# Patient Record
Sex: FEMALE | Race: WHITE | NOT HISPANIC OR LATINO | Employment: OTHER | URBAN - METROPOLITAN AREA
[De-identification: names, ages, dates, MRNs, and addresses within clinical notes are randomized per-mention and may not be internally consistent; named-entity substitution may affect disease eponyms.]

---

## 2019-08-16 ENCOUNTER — OFFICE VISIT (OUTPATIENT)
Dept: OPHTHALMOLOGY | Facility: CLINIC | Age: 69
End: 2019-08-16
Payer: MEDICARE

## 2019-08-16 DIAGNOSIS — H40.043 STEROID RESPONDERS, BILATERAL: ICD-10-CM

## 2019-08-16 DIAGNOSIS — H18.519 FUCHS' CORNEAL DYSTROPHY: Primary | ICD-10-CM

## 2019-08-16 PROCEDURE — 92004 COMPRE OPH EXAM NEW PT 1/>: CPT | Mod: S$PBB,,, | Performed by: OPHTHALMOLOGY

## 2019-08-16 PROCEDURE — 99202 OFFICE O/P NEW SF 15 MIN: CPT | Mod: PBBFAC | Performed by: OPHTHALMOLOGY

## 2019-08-16 PROCEDURE — 99999 PR PBB SHADOW E&M-NEW PATIENT-LVL II: ICD-10-PCS | Mod: PBBFAC,,, | Performed by: OPHTHALMOLOGY

## 2019-08-16 PROCEDURE — 92004 PR EYE EXAM, NEW PATIENT,COMPREHESV: ICD-10-PCS | Mod: S$PBB,,, | Performed by: OPHTHALMOLOGY

## 2019-08-16 PROCEDURE — 99999 PR PBB SHADOW E&M-NEW PATIENT-LVL II: CPT | Mod: PBBFAC,,, | Performed by: OPHTHALMOLOGY

## 2019-08-16 RX ORDER — SODIUM CHLORIDE 5 %
1 OINTMENT (GRAM) OPHTHALMIC (EYE) DAILY
COMMUNITY

## 2019-08-16 RX ORDER — TETRACAINE HYDROCHLORIDE 5 MG/ML
1 SOLUTION OPHTHALMIC
Status: CANCELLED | OUTPATIENT
Start: 2019-08-16

## 2019-08-16 RX ORDER — ALPRAZOLAM 1 MG/1
TABLET, EXTENDED RELEASE ORAL NIGHTLY
COMMUNITY

## 2019-08-16 RX ORDER — BRIMONIDINE TARTRATE 2 MG/ML
1 SOLUTION/ DROPS OPHTHALMIC EVERY 8 HOURS
COMMUNITY

## 2019-08-16 RX ORDER — DIGOXIN 250 MCG
250 TABLET ORAL DAILY
COMMUNITY

## 2019-08-16 RX ORDER — DORZOLAMIDE HCL 20 MG/ML
1 SOLUTION/ DROPS OPHTHALMIC 2 TIMES DAILY
COMMUNITY

## 2019-08-16 RX ORDER — WARFARIN SODIUM 5 MG/1
5 TABLET ORAL DAILY
COMMUNITY

## 2019-08-16 RX ORDER — MOXIFLOXACIN 5 MG/ML
1 SOLUTION/ DROPS OPHTHALMIC
Status: CANCELLED | OUTPATIENT
Start: 2019-08-16

## 2019-08-16 RX ORDER — BRIMONIDINE TARTRATE AND TIMOLOL MALEATE 2; 5 MG/ML; MG/ML
1 SOLUTION OPHTHALMIC 2 TIMES DAILY
COMMUNITY

## 2019-08-16 RX ORDER — FERROUS SULFATE, DRIED 160(50) MG
1 TABLET, EXTENDED RELEASE ORAL 2 TIMES DAILY WITH MEALS
COMMUNITY

## 2019-08-16 NOTE — LETTER
[facility]  Ophthalmology  [address]  [facility] [address]  Phone: [phone]  Fax: [phone]   August 16, 2019    Maxwell Colon MD  [address] [place] [address]  [facility] [place] [address]    Patient: Latia Underwood   MR Number: [id number]   YOB: 1950   Date of Visit: 8/16/2019       Dear Dr. Colon :    Thank you for referring Latai Underwood to me for evaluation. Here is my assessment and plan of care:    Fuchs' corneal dystrophy    Steroid responders, bilateral    Clinically significant corneal edema is present, which affects vision and activities of daily living. Risks, benefits, and alternatives to surgery were discussed and patient voices understanding.    DMEK left eye     If you have questions, please do not hesitate to call me. I look forward to following Latia Underwood along with you.    Sincerely,        Loki Yang MD       CC  No Recipients

## 2019-08-16 NOTE — Clinical Note
August 16, 2019      Maxwell Colon MD  [address] [place] [address]           [facility] - Ophthalmology  [address]  [facility] [address]  Phone: [phone]  Fax: [phone]          Patient: Latia Underwood   MR Number: [id number]   YOB: 1950   Date of Visit: 8/16/2019       Dear Dr. Maxwell Colon:    Thank you for referring Latia Underwood to me for evaluation. Attached you will find relevant portions of my assessment and plan of care.    If you have questions, please do not hesitate to call me. I look forward to following Latia Underwood along with you.    Sincerely,    Loki Yang MD    Enclosure  CC:  No Recipients    If you would like to receive this communication electronically, please contact [email or web address] or [phone] to request more information on [employer] access.    For providers and/or their staff who would like to refer a patient to [facility], please contact us through our one-stop-shop provider referral line, [facility], at [phone].    If you feel you have received this communication in error or would no longer like to receive these types of communications, please e-mail [email or web address]

## 2019-08-16 NOTE — PROGRESS NOTES
HPI     Pt was referred by Dr. Colon for fuchs dystrophy and corneal edema.     Pt states that she was sent by her Doctor to see cornea specialist for   possible DSEK. Pt has had cataract sx OU. Pt fell April 2017 and had to   have stitches above her OS. At that time she noticed a change in her OS.   Pt admits to occ floaters OU but denies any flashes OU. No pain or   discomfort OU.    Ocular hx  S/p PCIOL OS (07/14/15) Dr. Lr  S/p PCIOL OD (9/24/15) Dr. Lr  S/p Yag Cap OS (2017)  CME after cat sx OS  PCO OD  Retinal Vasculitis OU  Fuchs dystrophy OU   Corneal edema OU  Steroid responder     Pt confirms using  juventino 128 at least 6x a day OU  Juventino 128 emmy QHS OU  Vyzulta Qhs OS  Combigan BID OS  Dorzolamide BID OS    Pt was on Pred or Durezol QID OS but stopped on 7/29/19 by Dr. Garza     Last edited by Melida Gordon on 8/16/2019  9:35 AM. (History)            Assessment /Plan     For exam results, see Encounter Report.    Fuchs' corneal dystrophy    Steroid responders, bilateral      Clinically significant corneal edema is present, which affects vision and activities of daily living. Risks, benefits, and alternatives to surgery were discussed and patient voices understanding.    DMEK left eye

## 2020-05-20 ENCOUNTER — TELEPHONE (OUTPATIENT)
Dept: OPHTHALMOLOGY | Facility: CLINIC | Age: 70
End: 2020-05-20

## 2020-05-20 NOTE — TELEPHONE ENCOUNTER
----- Message from Jessa Fernández sent at 5/20/2020  3:43 PM CDT -----  Contact: Marya/Dr Joe Colon   Please call Marya at [phone]    Dr Colon office is requesting an immediate appt for the patient     Thank you